# Patient Record
Sex: MALE | Race: WHITE | NOT HISPANIC OR LATINO | Employment: FULL TIME | URBAN - METROPOLITAN AREA
[De-identification: names, ages, dates, MRNs, and addresses within clinical notes are randomized per-mention and may not be internally consistent; named-entity substitution may affect disease eponyms.]

---

## 2020-12-19 ENCOUNTER — ANCILLARY PROCEDURE (OUTPATIENT)
Dept: GENERAL RADIOLOGY | Facility: CLINIC | Age: 30
End: 2020-12-19
Attending: PEDIATRICS
Payer: COMMERCIAL

## 2020-12-19 ENCOUNTER — OFFICE VISIT (OUTPATIENT)
Dept: ORTHOPEDICS | Facility: CLINIC | Age: 30
End: 2020-12-19
Payer: COMMERCIAL

## 2020-12-19 VITALS
BODY MASS INDEX: 22.66 KG/M2 | WEIGHT: 153 LBS | HEIGHT: 69 IN | DIASTOLIC BLOOD PRESSURE: 84 MMHG | SYSTOLIC BLOOD PRESSURE: 136 MMHG

## 2020-12-19 DIAGNOSIS — M79.602 LEFT ARM PAIN: ICD-10-CM

## 2020-12-19 DIAGNOSIS — M79.602 LEFT ARM PAIN: Primary | ICD-10-CM

## 2020-12-19 PROCEDURE — 99204 OFFICE O/P NEW MOD 45 MIN: CPT | Performed by: PEDIATRICS

## 2020-12-19 PROCEDURE — 73030 X-RAY EXAM OF SHOULDER: CPT | Mod: LT | Performed by: RADIOLOGY

## 2020-12-19 PROCEDURE — 73080 X-RAY EXAM OF ELBOW: CPT | Mod: LT | Performed by: RADIOLOGY

## 2020-12-19 PROCEDURE — 72040 X-RAY EXAM NECK SPINE 2-3 VW: CPT | Performed by: RADIOLOGY

## 2020-12-19 RX ORDER — PREDNISONE 20 MG/1
20 TABLET ORAL
COMMUNITY
Start: 2020-12-14 | End: 2021-01-04

## 2020-12-19 ASSESSMENT — MIFFLIN-ST. JEOR: SCORE: 1634.38

## 2020-12-19 NOTE — PROGRESS NOTES
"Sports Medicine Clinic Visit    PCP: No Ref-Primary, Physician    Ruben MONTEIRO Christian is a 30 year old male who is seen  as a self referral presenting with left arm pain.    Injury: He reports left arm pain for 3 weeks. Has pain in left shoulder, biceps and forearm. Some numbness in forearm as well.  No clear injury, however, a few smaller ones. Holds his daughter in that arm, was lifting something with that arm and had pain, also tripped and fell onto the arm.  At one point has swelling and bruising in olecranon fossa. He denies neck pain.  - went to primary care and prednisone burst didn't help    Location of Pain: left shoulder and arm pain  Duration of Pain: 3 week(s)  Rating of Pain at worst: 5/10  Rating of Pain Currently: 4/10  Symptoms are better with: rest  Symptoms are worse with: extension, flexion and overhead motions, and lifting  Additional Features:   Positive: paresthesias, numbness and weakness   Negative: swelling, bruising, popping, grinding, catching, locking and instability  Other evaluation and/or treatments so far consists of: Nothing  Prior History of related problems: nothing    Social History: commercial water proofing    Review of Systems  Skin: some elbow bruising, some elbow swelling  Musculoskeletal: as above  Neurologic: yes numbness, paresthesias  Remainder of review of systems is negative including constitutional, CV, pulmonary, GI, except as noted in HPI or medical history.    Patient's current problem list, past medical and surgical history, and family history were reviewed.    Objective  /84   Ht 1.753 m (5' 9\")   Wt 69.4 kg (153 lb)   BMI 22.59 kg/m      GENERAL APPEARANCE: healthy, alert and no distress   GAIT: NORMAL  SKIN: no suspicious lesions or rashes  HEENT: Sclera clear, anicteric  CV: good peripheral pulses  RESP: Breathing not labored  NEURO: Normal strength and tone, mentation intact and speech normal  PSYCH:  mentation appears normal and affect " normal/bright    Cervical Spine Exam    Inspection:       No visible deformity        normal lordotic curvature maintained    Posture:      rounded shoulders and upper back    Tender:      none    Non-Tender:      remainder of cervical spine area    Range of Motion:       Full active and passive ROM forward flexion, extension, lateral rotation, lateral flexion.    Painful Motions:      none    Strength:     C4 (shoulder shrug)  symmetric 5/5       C5 (shoulder abduction) symmetric 5/5       C6 (elbow flexion) asymmetric 4/5       C7 (elbow extension) symmetric 5/5       C8 (finger abduction, thumb flexion) symmetric 5/5    Reflexes:      C5 (biceps) asymmetric diminished       C6 (supinator) symmetric normal       C7 (triceps) symmetric normal    Sensation:     grossly intact througout bilateral upper extremities       Area of forearm with numbness    Special Tests:      neg (-) Spurling    Skin:     well perfused       capillary refill brisk    Lymphatics:      no edema noted in the upper extremities     Bilateral Shoulder exam  Inspection and Posture:       rounded shoulders and upper back    Tender:        none    Non Tender:       remainder of shoulder bilateral    ROM:        forward flexion 120  left       abduction 120 left       internal rotation gluteal region left       external rotation 35 left    Painful motions:       flexion left       elevation left    Strength:        abduction 5/5 bilateral       flexion 5/5 bilateral       internal rotation 5/5 bilateral       external rotation 5/5 bilateral    Bilateral Elbow exam:    Inspection:     no ecchymosis       no edema or effusion  - questionable biceps defect    Tender:     antecubital space left    Non-Tender:      remainder of the elbow bilaterally    ROM:      flexion full bilateral       extension 15 left       forearm supination minimal left       forearm pronation minimal left    Strength:     flexion 3/5 left       extension 5/5 left        forearm supination 4/5 left       forearm pronation 4/5 left    Sensation:     intact throughout hand       intact throughout forearm       Area of numbness in forearm    Radiology  I ordered, visualized and reviewed these images with the patient  Xr Elbow Lt G/e 3 Vw  Result Date: 12/19/2020  ELBOW LEFT THREE OR MORE VIEWS    12/19/2020 8:49 AM HISTORY: Left elbow pain. COMPARISON: None.   IMPRESSION: No evidence of fracture or effusion.    Xr Shoulder Left G/e 3 Views  Result Date: 12/19/2020  SHOULDER LEFT THREE OR MORE VIEWS    12/19/2020 8:30 AM HISTORY: Left arm pain. COMPARISON: None.   IMPRESSION: The acromioclavicular joint and glenohumeral joints are unremarkable. No fracture or dislocation.    3 XR views of cervical reviewed: no acute bony abnormality, no significant degenerative change  - will follow official read    Assessment:  1. Left arm pain      Left arm pain with confusing history and physical.  Given history of bruising and swelling near elbow, some injuries, concern for biceps rupture, will obtain MRI and recommended close follow up.  Discussed possible cervical or shoulder etiology as well, though less likely given today's exam.    We discussed these other possible diagnosis: biceps tear, cervical radicular pain, shoulder pain    Plan:  - Today's Plan of Care:  MRI of the Left Elbow - Call [phone] to schedule MRI  Work Letter    -We also discussed other future treatment options:  Referral to Physical Therapy  MRI Cervical Spine    Follow Up: In clinic with Dr. Anegl after MRI (wait at least 1-2 days)    Concerning signs and symptoms were reviewed.  The patient expressed understanding of this management plan and all questions were answered at this time.    Lin Angel MD [facility]  Primary Care Sports Medicine  [facility]

## 2020-12-19 NOTE — LETTER
"    12/19/2020         RE: Ruben Gonzales  [address]  [place] [address]        Dear Colleague,    Thank you for referring your patient, Ruben Gonzales, to the [facility] [place]. Please see a copy of my visit note below.    Sports Medicine Clinic Visit    PCP: No Ref-Primary, Physician    Ruben Gonzales is a 30 year old male who is seen  as a self referral presenting with left arm pain.    Injury: He reports left arm pain for 3 weeks. Has pain in left shoulder, biceps and forearm. Some numbness in forearm as well.  No clear injury, however, a few smaller ones. Holds his daughter in that arm, was lifting something with that arm and had pain, also tripped and fell onto the arm.  At one point has swelling and bruising in olecranon fossa. He denies neck pain.  - went to primary care and prednisone burst didn't help    Location of Pain: left shoulder and arm pain  Duration of Pain: 3 week(s)  Rating of Pain at worst: 5/10  Rating of Pain Currently: 4/10  Symptoms are better with: rest  Symptoms are worse with: extension, flexion and overhead motions, and lifting  Additional Features:   Positive: paresthesias, numbness and weakness   Negative: swelling, bruising, popping, grinding, catching, locking and instability  Other evaluation and/or treatments so far consists of: Nothing  Prior History of related problems: nothing    Social History: commercial water proofing    Review of Systems  Skin: some elbow bruising, some elbow swelling  Musculoskeletal: as above  Neurologic: yes numbness, paresthesias  Remainder of review of systems is negative including constitutional, CV, pulmonary, GI, except as noted in HPI or medical history.    Patient's current problem list, past medical and surgical history, and family history were reviewed.    Objective  /84   Ht 1.753 m (5' 9\")   Wt 69.4 kg (153 lb)   BMI 22.59 kg/m      GENERAL APPEARANCE: healthy, alert and no distress   GAIT: " NORMAL  SKIN: no suspicious lesions or rashes  HEENT: Sclera clear, anicteric  CV: good peripheral pulses  RESP: Breathing not labored  NEURO: Normal strength and tone, mentation intact and speech normal  PSYCH:  mentation appears normal and affect normal/bright    Cervical Spine Exam    Inspection:       No visible deformity        normal lordotic curvature maintained    Posture:      rounded shoulders and upper back    Tender:      none    Non-Tender:      remainder of cervical spine area    Range of Motion:       Full active and passive ROM forward flexion, extension, lateral rotation, lateral flexion.    Painful Motions:      none    Strength:     C4 (shoulder shrug)  symmetric 5/5       C5 (shoulder abduction) symmetric 5/5       C6 (elbow flexion) asymmetric 4/5       C7 (elbow extension) symmetric 5/5       C8 (finger abduction, thumb flexion) symmetric 5/5    Reflexes:      C5 (biceps) asymmetric diminished       C6 (supinator) symmetric normal       C7 (triceps) symmetric normal    Sensation:     grossly intact througout bilateral upper extremities       Area of forearm with numbness    Special Tests:      neg (-) Spurling    Skin:     well perfused       capillary refill brisk    Lymphatics:      no edema noted in the upper extremities     Bilateral Shoulder exam  Inspection and Posture:       rounded shoulders and upper back    Tender:        none    Non Tender:       remainder of shoulder bilateral    ROM:        forward flexion 120  left       abduction 120 left       internal rotation gluteal region left       external rotation 35 left    Painful motions:       flexion left       elevation left    Strength:        abduction 5/5 bilateral       flexion 5/5 bilateral       internal rotation 5/5 bilateral       external rotation 5/5 bilateral    Bilateral Elbow exam:    Inspection:     no ecchymosis       no edema or effusion  - questionable biceps defect    Tender:     antecubital space  left    Non-Tender:      remainder of the elbow bilaterally    ROM:      flexion full bilateral       extension 15 left       forearm supination minimal left       forearm pronation minimal left    Strength:     flexion 3/5 left       extension 5/5 left       forearm supination 4/5 left       forearm pronation 4/5 left    Sensation:     intact throughout hand       intact throughout forearm       Area of numbness in forearm    Radiology  I ordered, visualized and reviewed these images with the patient  Xr Elbow Lt G/e 3 Vw  Result Date: 12/19/2020  ELBOW LEFT THREE OR MORE VIEWS    12/19/2020 8:49 AM HISTORY: Left elbow pain. COMPARISON: None.   IMPRESSION: No evidence of fracture or effusion.    Xr Shoulder Left G/e 3 Views  Result Date: 12/19/2020  SHOULDER LEFT THREE OR MORE VIEWS    12/19/2020 8:30 AM HISTORY: Left arm pain. COMPARISON: None.   IMPRESSION: The acromioclavicular joint and glenohumeral joints are unremarkable. No fracture or dislocation.    3 XR views of cervical reviewed: no acute bony abnormality, no significant degenerative change  - will follow official read    Assessment:  1. Left arm pain      Left arm pain with confusing history and physical.  Given history of bruising and swelling near elbow, some injuries, concern for biceps rupture, will obtain MRI and recommended close follow up.  Discussed possible cervical or shoulder etiology as well, though less likely given today's exam.    We discussed these other possible diagnosis: biceps tear, cervical radicular pain, shoulder pain    Plan:  - Today's Plan of Care:  MRI of the Left Elbow - Call [phone] to schedule MRI  Work Letter    -We also discussed other future treatment options:  Referral to Physical Therapy  MRI Cervical Spine    Follow Up: In clinic with Dr. Angel after MRI (wait at least 1-2 days)    Concerning signs and symptoms were reviewed.  The patient expressed understanding of this management plan and all questions were  answered at this time.    Lin Angel MD CAQ  Primary Care Sports Medicine  [facility]      Again, thank you for allowing me to participate in the care of your patient.        Sincerely,        Lin Angel MD

## 2020-12-19 NOTE — LETTER
December 19, 2020      Ruben MONTEIRO Christian  [address]  [place] [address]        To Whom It May Concern,      Ruben is under my care for left arm pain.  He should be off work at this time.  Follow up will be in 1-2 weeks.      Sincerely,        Lin Angel MD

## 2020-12-19 NOTE — PATIENT INSTRUCTIONS
We discussed these other possible diagnosis: biceps tear, cervical radicular pain, shoulder pain    Plan:  - Today's Plan of Care:  MRI of the Left Elbow - Call [phone] to schedule MRI  Work Letter    -We also discussed other future treatment options:  Referral to Physical Therapy  MRI Cervical Spine    Follow Up: In clinic with Dr. Angel after MRI (wait at least 1-2 days)    If you have any further questions for your physician or physician s care team you can call [phone] and use option 3 to leave a voice message. Calls received during business hours will be returned same day.

## 2020-12-21 NOTE — RESULT ENCOUNTER NOTE
These results were discussed during office visit.    Lin Angel MD, CAQ  Primary Care Sports Medicine  [facility]

## 2020-12-21 NOTE — RESULT ENCOUNTER NOTE
These results were discussed during office visit.    Lin Angel MD, CAQ  Primary Care Sports Medicine  [facility]

## 2020-12-21 NOTE — RESULT ENCOUNTER NOTE
These results were discussed during office visit.    Lin Angel MD, CAQ  Primary Care Sports Medicine  [facility]

## 2020-12-28 ENCOUNTER — ANCILLARY PROCEDURE (OUTPATIENT)
Dept: MRI IMAGING | Facility: CLINIC | Age: 30
End: 2020-12-28
Attending: PEDIATRICS
Payer: COMMERCIAL

## 2020-12-28 DIAGNOSIS — M79.602 LEFT ARM PAIN: ICD-10-CM

## 2020-12-28 PROCEDURE — 73221 MRI JOINT UPR EXTREM W/O DYE: CPT | Mod: LT | Performed by: RADIOLOGY

## 2020-12-29 ENCOUNTER — TELEPHONE (OUTPATIENT)
Dept: ORTHOPEDICS | Facility: CLINIC | Age: 30
End: 2020-12-29

## 2020-12-29 DIAGNOSIS — M79.602 LEFT ARM PAIN: Primary | ICD-10-CM

## 2020-12-29 NOTE — TELEPHONE ENCOUNTER
Please call patient with MRI results:    Mr Elbow Left W/o Contrast    Result Date: 12/29/2020  MRI of left elbow without  contrast 12/28/2020 6:41 PM History: eval biceps tear; Left arm pain Techniques: Multiplanar multisequence imaging through the left elbow were obtained without administration of intravenous gadolinium contrast. Comparison: Radiograph 12/19/2020. Findings: Motion artifact and inhomogeneous fat suppression compromises the examination. External marker is located along the anterior aspect of the forearm at the level of the proximal ulna. Medial compartment Ulnar collateral ligament: Intact Common flexor tendon: Intact without tendinosis. Medial epicondyle: No edema. Lateral compartment Radial collateral ligament: Intact Lateral ulnar collateral ligament: Intact. Radial annular ligament: Intact. Common extensor tendon: Intact without tendinosis. Lateral epicondyle: No edema. Posterior compartment Triceps: Intact without tendinosis. Olecranon: No edema. Bursitis: No significant fluid. Anterior compartment Biceps: Intact. Brachialis: Intact. Bicipitoradial bursa: No significant fluid. Articulations No high-grade chondral loss or evidence of erosion. No significant effusion. Bones (other than subarticular marrow): No evidence of fracture or marrow infiltrative change. Others: Nerves: Ulnar nerve and cubital tunnel are normal. Multifocal regions of mild intramuscular edema in the forearm involving both extensor and flexor compartment muscles, nonspecific, presumably sequelae of delayed onset muscle soreness.     Impression: 1. No MRI abnormality correlating to the external marker. Specifically the biceps tendon is intact. 2. Multifocal mild intramuscular edema in the forearm, nonspecific likely related to delayed onset muscle soreness. I have personally reviewed the examination and initial interpretation and I agree with the findings. PAMELA AGUIRRE MD (Joe)    In Summary:  - No biceps tendon  rupture  - Some intramuscular edema in the forearm    I Recommend:  - Please help patient schedule in clinic follow up next week for repeat exam and discussion of next steps in work up and treatment for his left sided arm pain  - If patient can schedule a PT visit prior to next week this could provide helpful as we figure out his current symptoms (please place referral).    Lin Angel MD

## 2020-12-29 NOTE — TELEPHONE ENCOUNTER
Discussed MRI results with patient. He is scheduled for a clinic follow up with Dr Angel on 1/4/21. At [facility] physical therapy order was placed and patient was instructed on scheduling a PT appointment. Patient had no further questions    George Vu ATC, LAT

## 2020-12-30 NOTE — PROGRESS NOTES
"Sports Medicine Clinic Visit - Interim History December 30, 2020    PCP: No Ref-Primary, Physician    Ruben MONTEIRO Christian is a 30 year old male who is seen in f/u up for    Left arm pain  Injury of left upper extremity, initial encounter. Since last visit on 12/19/20, patient has mild-moderate left elbow and forearm pain.  Patient reports that overall he is much improved.  Noting almost full elbow and shoulder AROM.  Mild soreness over anterior lateral elbow with numbness/tingling in volar radial forearm region - this is improving.  Patient reports mild relief while taking prednisone, but greater relief after discontinuing medication.  Here to further review results of MRI completed 12/28/20.    Initial Injury History 12/19/2020: He reports left arm pain for 3 weeks. Has pain in left shoulder, biceps and forearm. Some numbness in forearm as well.  No clear injury, however, a few smaller ones. Holds his daughter in that arm, was lifting something with that arm and had pain, also tripped and fell onto the arm.  At one point has swelling and bruising in olecranon fossa. He denies neck pain.  - went to primary care and prednisone burst didn't help    Symptoms are better with: Ice and Rest  Symptoms are worse with: repetitive elbow flexion, lifting  Additional Features:   Positive: numbness - volar radial forearm   Negative: swelling, bruising, popping, grinding, catching, locking, instability, paresthesias, weakness and systemic symptoms    Social History: commercial water proofing    Review of Systems  Skin: no bruising, no swelling  Musculoskeletal: as above  Neurologic: mild numbness, paresthesias  Remainder of review of systems is negative including constitutional, CV, pulmonary, GI, except as noted in HPI or medical history.    Patient's current problem list, past medical and surgical history, and family history were reviewed.    Objective  BP (!) 141/80   Ht 1.753 m (5' 9\")   Wt 68.9 kg (152 lb)   BMI 22.45 kg/m  "     GENERAL APPEARANCE: healthy, alert and no distress   GAIT: NORMAL  SKIN: no suspicious lesions or rashes  HEENT: Sclera clear, anicteric  CV: good peripheral pulses  RESP: Breathing not labored  NEURO: Normal strength and tone, mentation intact and speech normal  PSYCH:  mentation appears normal and affect normal/bright    Cervical Spine Exam    Inspection:       No visible deformity        normal lordotic curvature maintained    Posture:      normal    Tender:      none    Non-Tender:      remainder of cervical spine area    Range of Motion:       Full active and passive ROM forward flexion, extension, lateral rotation, lateral flexion.    Painful Motions:      none    Strength:     C4 (shoulder shrug)  symmetric 5/5       C5 (shoulder abduction) symmetric 5/5       C6 (elbow flexion) symmetric 5/5       C7 (elbow extension) symmetric 5/5       C8 (finger abduction, thumb flexion) symmetric 5/5    Reflexes:      C5 (biceps) symmetric normal       C6 (supinator) symmetric normal       C7 (triceps) symmetric normal    Sensation:     grossly intact througout bilateral upper extremities    Special Tests:      neg (-) Spurling    Skin:     well perfused       capillary refill brisk    Lymphatics:      no edema noted in the upper extremities     Bilateral Shoulder exam    Inspection and Posture:       normal    Skin:        no visible deformities    Tender:        none    Non Tender:       remainder of shoulder bilateral    ROM:        Full active and passive ROM with flexion, extension, abduction, internal and external rotation bilateral    Painful motions:       normal bilateral    Strength:        abduction 5/5 bilateral       flexion 5/5 bilateral       internal rotation 5/5 bilateral       external rotation 5/5 bilateral    Impingement testing:       neg (-) Neer left       neg (-) [place] left    Sensation:        normal sensation over shoulder and upper extremity       Mild area of altered sensation mid  forearm     Bilateral Elbow exam:    Inspection:     no ecchymosis       no edema or effusion    Tender:     Mild distal biceps and into forearm    Non-Tender:      remainder of the elbow bilaterally    ROM:      full with flexion, extension, forearm supination and pronation bilateral    Strength:     flexion 5/5 bilateral       extension 5/5 bilateral       forearm supination 5/5 bilateral       forearm pronation 5/5 bilateral    Sensation:        normal sensation over shoulder and upper extremity       Mild area of altered sensation mid forearm     Radiology  I ordered, visualized and reviewed these images with the patient  MRI of left elbow without  contrast 12/28/2020 6:41 PM     History: eval biceps tear; Left arm pain     Techniques: Multiplanar multisequence imaging through the left elbow  were obtained without administration of intravenous gadolinium  contrast.     Comparison: Radiograph 12/19/2020.     Findings: Motion artifact and inhomogeneous fat suppression  compromises the examination.     External marker is located along the anterior aspect of the forearm at  the level of the proximal ulna.     Medial compartment  Ulnar collateral ligament: Intact     Common flexor tendon: Intact without tendinosis.     Medial epicondyle: No edema.     Lateral compartment  Radial collateral ligament: Intact     Lateral ulnar collateral ligament: Intact.     Radial annular ligament: Intact.     Common extensor tendon: Intact without tendinosis.     Lateral epicondyle: No edema.     Posterior compartment  Triceps: Intact without tendinosis.     Olecranon: No edema.     Bursitis: No significant fluid.     Anterior compartment  Biceps: Intact.     Brachialis: Intact.     Bicipitoradial bursa: No significant fluid.     Articulations  No high-grade chondral loss or evidence of erosion. No significant  effusion.     Bones (other than subarticular marrow): No evidence of fracture or  marrow infiltrative  change.     Others:     Nerves: Ulnar nerve and cubital tunnel are normal.     Multifocal regions of mild intramuscular edema in the forearm  involving both extensor and flexor compartment muscles, nonspecific,  presumably sequelae of delayed onset muscle soreness.                                                                      Impression:  1. No MRI abnormality correlating to the external marker. Specifically  the biceps tendon is intact.     2. Multifocal mild intramuscular edema in the forearm, nonspecific  likely related to delayed onset muscle soreness.     I have personally reviewed the examination and initial interpretation  and I agree with the findings.     Assessment:  1. Left arm pain    2. Injury of left upper extremity, initial encounter      Left arm pain, likely strain of biceps and forearm - overall improving.  Less concern for cervical etiology given today's exam.  Discussed physical therapy evaluation and gradual return to activities as tolerated. Work letter given, follow up if symptoms fail to improve.    Ruben to follow up with Primary Care provider regarding elevated blood pressure.    Plan:  - Today's Plan of Care:  Schedule Physical Therapy  Work Letter    Follow Up: 1 month if needed    Concerning signs and symptoms were reviewed.  The patient expressed understanding of this management plan and all questions were answered at this time.    Lin Angel MD CA  Primary Care Sports Medicine  [facility]

## 2021-01-04 ENCOUNTER — OFFICE VISIT (OUTPATIENT)
Dept: ORTHOPEDICS | Facility: CLINIC | Age: 31
End: 2021-01-04
Payer: COMMERCIAL

## 2021-01-04 VITALS
HEIGHT: 69 IN | BODY MASS INDEX: 22.51 KG/M2 | WEIGHT: 152 LBS | SYSTOLIC BLOOD PRESSURE: 141 MMHG | DIASTOLIC BLOOD PRESSURE: 80 MMHG

## 2021-01-04 DIAGNOSIS — M79.602 LEFT ARM PAIN: Primary | ICD-10-CM

## 2021-01-04 DIAGNOSIS — S49.92XA INJURY OF LEFT UPPER EXTREMITY, INITIAL ENCOUNTER: ICD-10-CM

## 2021-01-04 PROCEDURE — 99213 OFFICE O/P EST LOW 20 MIN: CPT | Performed by: PEDIATRICS

## 2021-01-04 ASSESSMENT — MIFFLIN-ST. JEOR: SCORE: 1629.85

## 2021-01-04 NOTE — LETTER
January 4, 2021      Ruben MONTEIRO Christian  [address]  [place] [facility] [address]        To Whom It May Concern,     Ruben is under my care for left arm injury.  He can return to work with the following restrictions:  - Limit lifting with the left arm to 25 lbs occasionally for the next 2 weeks.    Then can return without restrictions.  Rest if having pain.      Sincerely,        Lin Angel MD

## 2021-01-04 NOTE — PATIENT INSTRUCTIONS
Ruben to follow up with Primary Care provider regarding elevated blood pressure.    Plan:  - Today's Plan of Care:  Schedule Physical Therapy  Work Letter    Follow Up: 1 month if needed    If you have any further questions for your physician or physician s care team you can call [phone] and use option 3 to leave a voice message. Calls received during business hours will be returned same day.

## 2021-01-04 NOTE — LETTER
1/4/2021         RE: Ruben Gonzales  [address]  [facility] [address]        Dear Colleague,    Thank you for referring your patient, Ruben Gonzales, to the [facility]. Please see a copy of my visit note below.    Sports Medicine Clinic Visit - Interim History December 30, 2020    PCP: No Ref-Primary, Physician    Ruben Gonzales is a 30 year old male who is seen in f/u up for    Left arm pain  Injury of left upper extremity, initial encounter. Since last visit on 12/19/20, patient has mild-moderate left elbow and forearm pain.  Patient reports that overall he is much improved.  Noting almost full elbow and shoulder AROM.  Mild soreness over anterior lateral elbow with numbness/tingling in volar radial forearm region - this is improving.  Patient reports mild relief while taking prednisone, but greater relief after discontinuing medication.  Here to further review results of MRI completed 12/28/20.    Initial Injury History 12/19/2020: He reports left arm pain for 3 weeks. Has pain in left shoulder, biceps and forearm. Some numbness in forearm as well.  No clear injury, however, a few smaller ones. Holds his daughter in that arm, was lifting something with that arm and had pain, also tripped and fell onto the arm.  At one point has swelling and bruising in olecranon fossa. He denies neck pain.  - went to primary care and prednisone burst didn't help    Symptoms are better with: Ice and Rest  Symptoms are worse with: repetitive elbow flexion, lifting  Additional Features:   Positive: numbness - volar radial forearm   Negative: swelling, bruising, popping, grinding, catching, locking, instability, paresthesias, weakness and systemic symptoms    Social History: commercial water proofing    Review of Systems  Skin: no bruising, no swelling  Musculoskeletal: as above  Neurologic: mild numbness, paresthesias  Remainder of review of systems is negative including  "constitutional, CV, pulmonary, GI, except as noted in HPI or medical history.    Patient's current problem list, past medical and surgical history, and family history were reviewed.    Objective  BP (!) 141/80   Ht 1.753 m (5' 9\")   Wt 68.9 kg (152 lb)   BMI 22.45 kg/m      GENERAL APPEARANCE: healthy, alert and no distress   GAIT: NORMAL  SKIN: no suspicious lesions or rashes  HEENT: Sclera clear, anicteric  CV: good peripheral pulses  RESP: Breathing not labored  NEURO: Normal strength and tone, mentation intact and speech normal  PSYCH:  mentation appears normal and affect normal/bright    Cervical Spine Exam    Inspection:       No visible deformity        normal lordotic curvature maintained    Posture:      normal    Tender:      none    Non-Tender:      remainder of cervical spine area    Range of Motion:       Full active and passive ROM forward flexion, extension, lateral rotation, lateral flexion.    Painful Motions:      none    Strength:     C4 (shoulder shrug)  symmetric 5/5       C5 (shoulder abduction) symmetric 5/5       C6 (elbow flexion) symmetric 5/5       C7 (elbow extension) symmetric 5/5       C8 (finger abduction, thumb flexion) symmetric 5/5    Reflexes:      C5 (biceps) symmetric normal       C6 (supinator) symmetric normal       C7 (triceps) symmetric normal    Sensation:     grossly intact througout bilateral upper extremities    Special Tests:      neg (-) Spurling    Skin:     well perfused       capillary refill brisk    Lymphatics:      no edema noted in the upper extremities     Bilateral Shoulder exam    Inspection and Posture:       normal    Skin:        no visible deformities    Tender:        none    Non Tender:       remainder of shoulder bilateral    ROM:        Full active and passive ROM with flexion, extension, abduction, internal and external rotation bilateral    Painful motions:       normal bilateral    Strength:        abduction 5/5 bilateral       flexion 5/5 " bilateral       internal rotation 5/5 bilateral       external rotation 5/5 bilateral    Impingement testing:       neg (-) Neer left       neg (-) [place]ingsworth left    Sensation:        normal sensation over shoulder and upper extremity       Mild area of altered sensation mid forearm     Bilateral Elbow exam:    Inspection:     no ecchymosis       no edema or effusion    Tender:     Mild distal biceps and into forearm    Non-Tender:      remainder of the elbow bilaterally    ROM:      full with flexion, extension, forearm supination and pronation bilateral    Strength:     flexion 5/5 bilateral       extension 5/5 bilateral       forearm supination 5/5 bilateral       forearm pronation 5/5 bilateral    Sensation:        normal sensation over shoulder and upper extremity       Mild area of altered sensation mid forearm     Radiology  I ordered, visualized and reviewed these images with the patient  MRI of left elbow without  contrast 12/28/2020 6:41 PM     History: eval biceps tear; Left arm pain     Techniques: Multiplanar multisequence imaging through the left elbow  were obtained without administration of intravenous gadolinium  contrast.     Comparison: Radiograph 12/19/2020.     Findings: Motion artifact and inhomogeneous fat suppression  compromises the examination.     External marker is located along the anterior aspect of the forearm at  the level of the proximal ulna.     Medial compartment  Ulnar collateral ligament: Intact     Common flexor tendon: Intact without tendinosis.     Medial epicondyle: No edema.     Lateral compartment  Radial collateral ligament: Intact     Lateral ulnar collateral ligament: Intact.     Radial annular ligament: Intact.     Common extensor tendon: Intact without tendinosis.     Lateral epicondyle: No edema.     Posterior compartment  Triceps: Intact without tendinosis.     Olecranon: No edema.     Bursitis: No significant fluid.     Anterior compartment  Biceps:  Intact.     Brachialis: Intact.     Bicipitoradial bursa: No significant fluid.     Articulations  No high-grade chondral loss or evidence of erosion. No significant  effusion.     Bones (other than subarticular marrow): No evidence of fracture or  marrow infiltrative change.     Others:     Nerves: Ulnar nerve and cubital tunnel are normal.     Multifocal regions of mild intramuscular edema in the forearm  involving both extensor and flexor compartment muscles, nonspecific,  presumably sequelae of delayed onset muscle soreness.                                                                      Impression:  1. No MRI abnormality correlating to the external marker. Specifically  the biceps tendon is intact.     2. Multifocal mild intramuscular edema in the forearm, nonspecific  likely related to delayed onset muscle soreness.     I have personally reviewed the examination and initial interpretation  and I agree with the findings.     Assessment:  1. Left arm pain    2. Injury of left upper extremity, initial encounter      Left arm pain, likely strain of biceps and forearm - overall improving.  Less concern for cervical etiology given today's exam.  Discussed physical therapy evaluation and gradual return to activities as tolerated. Work letter given, follow up if symptoms fail to improve.    Ruben to follow up with Primary Care provider regarding elevated blood pressure.    Plan:  - Today's Plan of Care:  Schedule Physical Therapy  Work Letter    Follow Up: 1 month if needed    Concerning signs and symptoms were reviewed.  The patient expressed understanding of this management plan and all questions were answered at this time.    Lin Angel MD [facility]  Primary Care Sports Medicine  [facility]      Again, thank you for allowing me to participate in the care of your patient.        Sincerely,        Lin Angel MD

## 2021-01-05 NOTE — RESULT ENCOUNTER NOTE
These results were discussed during office visit.    Lin nAgel MD, CAQ  Primary Care Sports Medicine  [facility]

## 2024-01-10 ENCOUNTER — OFFICE VISIT (OUTPATIENT)
Dept: URGENT CARE | Facility: URGENT CARE | Age: 34
End: 2024-01-10
Payer: COMMERCIAL

## 2024-01-10 VITALS
RESPIRATION RATE: 17 BRPM | SYSTOLIC BLOOD PRESSURE: 154 MMHG | DIASTOLIC BLOOD PRESSURE: 100 MMHG | WEIGHT: 140.8 LBS | TEMPERATURE: 98.9 F | OXYGEN SATURATION: 98 % | BODY MASS INDEX: 20.79 KG/M2 | HEART RATE: 90 BPM

## 2024-01-10 DIAGNOSIS — R35.0 URINARY FREQUENCY: ICD-10-CM

## 2024-01-10 DIAGNOSIS — R03.0 ELEVATED BLOOD PRESSURE READING: ICD-10-CM

## 2024-01-10 DIAGNOSIS — M54.50 ACUTE BILATERAL LOW BACK PAIN WITHOUT SCIATICA: Primary | ICD-10-CM

## 2024-01-10 LAB
ALBUMIN UR-MCNC: NEGATIVE MG/DL
APPEARANCE UR: CLEAR
BILIRUB UR QL STRIP: NEGATIVE
COLOR UR AUTO: YELLOW
GLUCOSE UR STRIP-MCNC: NEGATIVE MG/DL
HGB UR QL STRIP: NEGATIVE
KETONES UR STRIP-MCNC: NEGATIVE MG/DL
LEUKOCYTE ESTERASE UR QL STRIP: NEGATIVE
NITRATE UR QL: NEGATIVE
PH UR STRIP: 6 [PH] (ref 5–7)
SP GR UR STRIP: <=1.005 (ref 1–1.03)
UROBILINOGEN UR STRIP-ACNC: 0.2 E.U./DL

## 2024-01-10 PROCEDURE — 99203 OFFICE O/P NEW LOW 30 MIN: CPT

## 2024-01-10 PROCEDURE — 81003 URINALYSIS AUTO W/O SCOPE: CPT

## 2024-01-11 NOTE — PATIENT INSTRUCTIONS
Diagnosis: bilateral back pain       Today we did:  Ua - normal, no signs of infection   Exam - no concerns     Plan:   Increase hydration   Preferably electrolytes   Monitor for improvement   If not improvement follow up pcp for further eval   Deeper dive into symptoms   If worsening follow up in ED   Can do a CT scan of kidneys for other pathology     Monitor for:   Decreased urine output   Or no urine out put   Fevers   Worsening pain

## 2024-01-11 NOTE — PROGRESS NOTES
URGENT CARE  Assessment & Plan   Assessment:   Ruben Gonzales is a 33 year old male who's clinical presentation today is consistent with:   1. Acute bilateral low back pain without sciatica  - Primary Care Referral; Future  2. Urinary frequency  - UA Macroscopic with reflex to Microscopic and Culture;   - Primary Care Referral; Future  Plan:  Patient is well appearing, afebrile, had reassuring vital signs and a benign physical exam. Given UA was reassuring  will encourage patient to continue to closely monitor symptoms and additionally treat them at this time with supportive and symptomatic measures, which include: increasing fluids   Provided reassurance that exam was normal today,  Additionally we discussed if symptoms do not improve after starting today's treatment (or if symptoms worsen) to follow up in 7-10 days.  Referral placed for pcp follow up   No alarm signs or symptoms present     3. Elevated blood pressure reading  - Primary Care Referral; Future  Plan:  Additionally an isolated elevated bp was noted today, given patient has no known hypertension diagnosis or history  and is not symptomatic (but in acute pain) educated patient he  needs to follow up with his  PCP when he is not acutely ill for further evaluation and treatment of his elevated blood pressure reading, we discussed that a second reading is often needed to diagnose hypertension and to start blood pressure medications.  Also discussed with the patient today the signs and symptoms of a hypertensive emergency/ urgency, a cardiac event and/or a stroke; he states an understanding and will return to the ED should he note any of these discussed symptoms.    No alarm signs or symptoms present     Patient is} agreeable to treatment plan and state they will follow-up if symptoms do not improve and/or if symptoms worsen   see patient's AVS 'monitor for' section for specific patient instructions given and discussed regarding what to watch for and when  to follow up    ENID [facility]      ______________________________________________________________________      Subjective     HPI: Ruben Gonzales  is a 33 year old  male who presents today for evaluation the following concerns:   The patient presents today complaining of bilateral back/ flank pain for 1-2 weeks   Patient states the pain is mild and dull  Denies sharp or colicy pain, denies hx of stones or other kidney issues/conditions   Patient denies ever having anything like this in the past.    Patient endorses darker, more pungent urine   Denies dysuria or burning   Denies discharge from penis  Denies need to assess for STDs   Denies fever, chills or abdominal or pelvis pain     Review of Systems:  Pertinent review of systems as reflected in HPI, otherwise negative.     Objective    Physical Exam:  Vitals:    01/10/24 1857   BP: (!) 154/100   BP Location: Right arm   Cuff Size: Adult Regular   Pulse: 90   Resp: 17   Temp: 98.9  F (37.2  C)   TempSrc: Tympanic   SpO2: 98%   Weight: 63.9 kg (140 lb 12.8 oz)      General: Alert and oriented, no acute distress, afebrile,  Psy/mental status: Nonanxious, cooperative  SKIN: Intact, no open areas  ABDOMEN:  soft, non-tender, non-distended    No flank pain or CVA tenderness to palpation bilaterally  Pelvic/ :   Deferred      LABS:   Results for orders placed or performed in visit on 01/10/24   UA Macroscopic with reflex to Microscopic and Culture     Status: Normal    Specimen: Urine, Clean Catch   Result Value Ref Range    Color Urine Yellow Colorless, Straw, Light Yellow, Yellow    Appearance Urine Clear Clear    Glucose Urine Negative Negative mg/dL    Bilirubin Urine Negative Negative    Ketones Urine Negative Negative mg/dL    Specific Gravity Urine <=1.005 1.003 - 1.035    Blood Urine Negative Negative    pH Urine 6.0 5.0 - 7.0    Protein Albumin Urine Negative Negative mg/dL    Urobilinogen Urine 0.2 0.2, 1.0  E.U./dL    Nitrite Urine Negative Negative    Leukocyte Esterase Urine Negative Negative    Narrative    Microscopic not indicated        ______________________________________________________________________    I explained my diagnostic considerations and recommendations to the patient, who voiced understanding and agreement with the treatment plan.   All questions were answered.   We discussed potential side effects, risks and benefits of any prescribed or recommended therapies, as well as expectations for response to treatments.  Please see AVS for any patient instructions & handouts given.   Patient was advised to contact the Nurse Care Line, their Primary Care provider, Urgent Care, or the Emergency Department if there are new or worsening symptoms, or call 911 for emergencies.